# Patient Record
Sex: FEMALE | Race: WHITE | NOT HISPANIC OR LATINO | Employment: OTHER | URBAN - METROPOLITAN AREA
[De-identification: names, ages, dates, MRNs, and addresses within clinical notes are randomized per-mention and may not be internally consistent; named-entity substitution may affect disease eponyms.]

---

## 2017-02-21 NOTE — PATIENT DISCUSSION
DIABETES without RETINOPATHY:  I have talked with the patient about the potential for future development of diabetic retinopathy and the potential for significant visual complications. For this reason, regular follow-up with an ophthalmologist is essential. Vision may fluctuate with blood sugar, so the patient should continue to maintain stable blood sugar.

## 2017-02-21 NOTE — PATIENT DISCUSSION
DIABETES WITHOUT RETINOPATHY- Continue to keep Blood Sugar under control, any changes pt was told to call office for an appt

## 2017-02-21 NOTE — PATIENT DISCUSSION
DRY EYES : Discussed with patient the importance of keeping the eye moist and the symptoms associated with dry eyes including blurry vision, tearing, burning, and hany sensation. Advised patient to minimize use of any fans blowing directly on the face. Advised patient to continue with artificial tears 2-3 times daily.

## 2017-02-21 NOTE — PATIENT DISCUSSION
POAG with Elevated IOP Counseling:  I have explained the diagnosis of  glaucoma and discussed the importance of lowering intraocular pressure to prevent further optic nerve damage and possible blindness. I have discussed the various treatment options including medications, SLT laser and surgery. I have reviewed the regimen of drops with the patient. I emphasized to the patient the importance of compliance with treatment and follow-up appointments. Patient instructed to return for follow-up as scheduled.

## 2017-05-22 NOTE — TELEPHONE ENCOUNTER
I spoke to the pt, she states she used an old make up to her eyelids over the weekend and now here eyelids only are red and itchy. She wants something called in for the lid irritation. I spoke to Donald, she will ask Dr Harrison and let the patient know

## 2017-05-23 NOTE — TELEPHONE ENCOUNTER
Dr. Harrison reviewed Selma's chart and noted she was using Tobramycin ointment in the past for the same symptoms. He did ok a refill on the ointment to her pharmacy. Patient was notified, she will  the prescription and call us if she has any other problems.

## 2017-10-04 NOTE — PATIENT DISCUSSION
New Prescription: Maxitrol (neomycin-polymyxin-dexameth): ointment: 3.5-10,000-0.1 mg-unit/g-% 1 a small amount three times a day as directed into both eyes 10-

## 2017-10-19 ENCOUNTER — APPOINTMENT (OUTPATIENT)
Dept: OPHTHALMOLOGY | Facility: CLINIC | Age: 82
End: 2017-10-19
Payer: MEDICARE

## 2017-10-19 ENCOUNTER — TELEPHONE (OUTPATIENT)
Dept: OPHTHALMOLOGY | Facility: CLINIC | Age: 82
End: 2017-10-19

## 2017-10-19 ENCOUNTER — OFFICE VISIT (OUTPATIENT)
Dept: OPHTHALMOLOGY | Facility: CLINIC | Age: 82
End: 2017-10-19
Payer: MEDICARE

## 2017-10-19 DIAGNOSIS — H04.123 DRY EYES, BILATERAL: ICD-10-CM

## 2017-10-19 DIAGNOSIS — H40.003 GLAUCOMA SUSPECT, BILATERAL: Primary | ICD-10-CM

## 2017-10-19 DIAGNOSIS — E11.9 DIABETES MELLITUS WITHOUT OPHTHALMIC MANIFESTATIONS: ICD-10-CM

## 2017-10-19 DIAGNOSIS — Z96.1 PSEUDOPHAKIA OF BOTH EYES: ICD-10-CM

## 2017-10-19 PROCEDURE — 99999 PR PBB SHADOW E&M-EST. PATIENT-LVL II: CPT | Mod: PBBFAC,,, | Performed by: OPHTHALMOLOGY

## 2017-10-19 PROCEDURE — 92014 COMPRE OPH EXAM EST PT 1/>: CPT | Mod: S$PBB,,, | Performed by: OPHTHALMOLOGY

## 2017-10-19 PROCEDURE — 92250 FUNDUS PHOTOGRAPHY W/I&R: CPT | Mod: PBBFAC,PO | Performed by: OPHTHALMOLOGY

## 2017-10-19 PROCEDURE — 92083 EXTENDED VISUAL FIELD XM: CPT | Mod: PBBFAC,PO | Performed by: OPHTHALMOLOGY

## 2017-10-19 PROCEDURE — 99212 OFFICE O/P EST SF 10 MIN: CPT | Mod: PBBFAC,PO | Performed by: OPHTHALMOLOGY

## 2017-10-19 RX ORDER — PRIMIDONE 50 MG/1
50 TABLET ORAL
COMMUNITY
Start: 2017-08-03 | End: 2018-08-03

## 2017-10-19 RX ORDER — ARIPIPRAZOLE 2 MG/1
TABLET ORAL
COMMUNITY
Start: 2017-09-01

## 2017-10-19 NOTE — PROGRESS NOTES
SUBJECTIVE:   Selma Lopez is a 82 y.o. female   Uncorrected distance visual acuity was 20/25 -1 in the right eye and 20/50 in the left eye.   Chief Complaint   Patient presents with    Glaucoma Suspect     1 year hvf, dilation, sdp's    Dry Eye     c/o dryness OU. using at's        HPI:  HPI     Glaucoma Suspect    Additional comments: 1 year hvf, dilation, sdp's           Dry Eye    Additional comments: c/o dryness OU. using at's           Comments   1.PCIOL OD 2/15/06  PCIOL OS 3/15/06  YAG OD 6/10/09  YAG OS 5/18/09  2. Oasis Form Fit plugs OU .3mm 6/28/12  3. Coag Suspect  4. DM since 2000  5. Excisional biopsy of LLL 10/13/2016    Refresh TID OU       Last edited by Jennifer Beckford MA on 10/19/2017  1:53 PM. (History)        Assessment /Plan :  1. Glaucoma suspect, bilateral No evidence of glaucoma at this time but based on risk factors recommend to continue monitoring.     2. Diabetes mellitus without ophthalmic manifestations No diabetic retinopathy at this time. Reviewed diabetic eye precautions including avoiding tobacco use,  Good glucose control, and importance of regular follow up.      3. Pseudophakia of both eyes stable   4.      Dry Eyes continue artificial tears prn OU    Return to clinic in 1 year  or as needed.  With 24-2 HVF, Dilation and SDP's

## 2017-10-19 NOTE — TELEPHONE ENCOUNTER
Left message that she has her appointment sheet, just call to schedule appointment in 1 year with VF and dilated exam.  Call [phone] to schedule next appointment

## 2017-10-20 ENCOUNTER — TELEPHONE (OUTPATIENT)
Dept: OPHTHALMOLOGY | Facility: CLINIC | Age: 82
End: 2017-10-20

## 2017-10-20 NOTE — TELEPHONE ENCOUNTER
Attempted to call patient regarding her yearly appointment. Was not able to reach patient or leave a message. Will forward message to Danae to schedule patient's next visit with Dr. Harrison.     ----- Message from Nelida Vazquez sent at 10/19/2017  3:52 PM CDT -----  Contact: Pt  Pt called and stated she needed to speak to the nurse. She can be reached at [phone].    Thanks,  TF

## 2017-11-21 ENCOUNTER — HOSPITAL ENCOUNTER (OUTPATIENT)
Dept: RADIOLOGY | Facility: HOSPITAL | Age: 82
Discharge: HOME | End: 2017-11-21
Attending: NURSE PRACTITIONER
Payer: MEDICARE

## 2017-11-21 ENCOUNTER — OFFICE VISIT (OUTPATIENT)
Dept: HEMATOLOGY/ONCOLOGY | Facility: CLINIC | Age: 82
End: 2017-11-21
Payer: MEDICARE

## 2017-11-21 VITALS
DIASTOLIC BLOOD PRESSURE: 72 MMHG | HEART RATE: 82 BPM | BODY MASS INDEX: 33.32 KG/M2 | HEIGHT: 65 IN | HEIGHT: 65 IN | WEIGHT: 200 LBS | SYSTOLIC BLOOD PRESSURE: 132 MMHG | RESPIRATION RATE: 16 BRPM | WEIGHT: 200 LBS | BODY MASS INDEX: 33.32 KG/M2

## 2017-11-21 DIAGNOSIS — Z08 ENCOUNTER FOR FOLLOW-UP SURVEILLANCE OF BREAST CANCER: ICD-10-CM

## 2017-11-21 DIAGNOSIS — Z12.39 BREAST CANCER SCREENING, HIGH RISK PATIENT: ICD-10-CM

## 2017-11-21 DIAGNOSIS — C80.1 CANCER: Primary | ICD-10-CM

## 2017-11-21 DIAGNOSIS — Z85.3 PERSONAL HISTORY OF BREAST CANCER: ICD-10-CM

## 2017-11-21 DIAGNOSIS — Z85.3 ENCOUNTER FOR FOLLOW-UP SURVEILLANCE OF BREAST CANCER: ICD-10-CM

## 2017-11-21 PROCEDURE — 99999 PR PBB SHADOW E&M-EST. PATIENT-LVL IV: CPT | Mod: PBBFAC,,, | Performed by: NURSE PRACTITIONER

## 2017-11-21 PROCEDURE — 77067 SCR MAMMO BI INCL CAD: CPT | Mod: 26,,, | Performed by: RADIOLOGY

## 2017-11-21 PROCEDURE — 99214 OFFICE O/P EST MOD 30 MIN: CPT | Mod: PBBFAC,PO | Performed by: NURSE PRACTITIONER

## 2017-11-21 PROCEDURE — 77067 SCR MAMMO BI INCL CAD: CPT | Mod: TC

## 2017-11-21 PROCEDURE — 99213 OFFICE O/P EST LOW 20 MIN: CPT | Mod: S$PBB,,, | Performed by: NURSE PRACTITIONER

## 2017-11-21 PROCEDURE — 77063 BREAST TOMOSYNTHESIS BI: CPT | Mod: 26,,, | Performed by: RADIOLOGY

## 2017-11-21 NOTE — PROGRESS NOTES
HEMATOLOGY/ONCOLOGY    CC: Annual mammogram and breast exam    HPI: Prior history of a right breast cancer. 82-year-old white female who in 1996 underwent right total mastectomy for intraductal carcinoma. At that time, she had implants placed. She has had no real problems with her right breast since then. Approximately five years ago, she underwent a stereotactic core biopsy of the left breast which was benign. Noted to have questionable skin changes in the upperouter quadrant of the right breast.   S/P left breast stereotactic core needle biopsy performed in September of 2007, benign.     ROS: The patient denies any breast pain, nipple discharge or lumps out of the ordinary. She has tenderness in her breast only when the breast is palpated. No donn pain out of the ordinary. No cough or SOB. No chest pain. Has chronic pnd. No aches or pains. No night sweats out of the ordinary or wt loss. Balance of the ROS is negative.     PE:   OBJECTIVE: This is an adult female in no acute distress, awake, alert, and oriented x3.   HEAD: Atraumatic and normocephalic.  NECK: Supple. Trachea midline.No thyromegaly. Scar at base of neck well healed.  LYMPHATICS: There is no cervical, supraclavicular, infraclavicular adenopathy. She has no palpable nodes in either axilla.   HEART: RRR with no murmurs or gallops.  LUNGS: CTA unlabored respiratory effort.  SKIN: Warm and dry to touch. No petechaie or bruising. See Breast exam for breast skin change description.   ABD: Soft, obese and nontender.  BREAST: No visible mass, erythema, rash or dimpling. Right breast reconstructed mound noted. No visible skin changes noted. In the upright position the right breast implant appears to be higher than the rest of the breast. When lies down the implant spreads out evenly. No nipple discharge elicited with compression in the left breast. No palpable abnormality was appreciated in the left breast or on the right reconstructed breast. No bumpy skin  changes noted on today's exam as noted in the past.     MAMMO: today- results pending    ASSESSMENT:   1. History of right breast cancer   2. mammogram left breast- today- result pending.   3. Negative clinical exam.         PLAN AND RECOMMENDATIONS:   1. BSE monthly, call for any changes.   2. RTC in one year for left diagnostic mammo and clinical exam.   3. Call if she has not heard regarding mammo results in one week.   4. Encouraged to walk to help prevent recurrence and discussed recurrence signs to report.     Ashley Zeng RN, MSN, NP-C               Answers for HPI/ROS submitted by the patient on 11/21/2017   appetite change : No  unexpected weight change: No  visual disturbance: No  cough: No  shortness of breath: No  chest pain: No  abdominal pain: No  diarrhea: No  frequency: No  back pain: No  rash: No  headaches: No  adenopathy: No  nervous/ anxious: No

## 2018-02-22 NOTE — PATIENT DISCUSSION
POAG, OU: INTRAOCULAR PRESSURE IS WITHIN ACCEPTABLE LIMITS. PT INSTRUCTED TO CONTINUE TREATMENT WITHOUT DROPS  AND RETURN FOR FOLLOW-UP AS SCHEDULED.

## 2018-05-25 ENCOUNTER — TELEPHONE (OUTPATIENT)
Dept: OPHTHALMOLOGY | Facility: CLINIC | Age: 83
End: 2018-05-25

## 2018-05-25 NOTE — TELEPHONE ENCOUNTER
Informed pt that no doctors are in currently and that we cannot call anything in. She would have to wait until Monday. Pt stated that if she is still experiencing symptoms on Monday, she will call back.

## 2018-05-25 NOTE — TELEPHONE ENCOUNTER
----- Message from Daina Neal sent at 5/25/2018  4:10 PM CDT -----  Contact: [phone]  Would like to consult with nurse regarding mucus in her eye, please call back at [phone]. Thanks/ar

## 2018-08-23 NOTE — PATIENT DISCUSSION
POAG, OU: INTRAOCULAR PRESSURE IS WITHIN ACCEPTABLE LIMITS. PT INSTRUCTED TO CONTINUE TREATMENT WITH OUT DROPS  AND RETURN FOR FOLLOW-UP AS SCHEDULED.

## 2018-11-29 ENCOUNTER — TELEPHONE (OUTPATIENT)
Dept: HEMATOLOGY/ONCOLOGY | Facility: CLINIC | Age: 83
End: 2018-11-29

## 2018-11-29 NOTE — TELEPHONE ENCOUNTER
Spoke with pt and she said that she is in [facility]. That she needs orders to have a Mammo done out there where she is and her records. I asked her who it was and where to fax it to [facility] we needed to find out if they will take out of state orders. Pt said her daughter called them already and they will. Pt did not know who the provider was or their info and said she will call back with the info so we can fax the orders to them. And I will give her medical records number at [phone].

## 2018-11-29 NOTE — TELEPHONE ENCOUNTER
Spoke to pt again and informed her per Ashley Zeng NP that she recommends that she wait until she comes back here to do her Mammo if she is not going to be living out there. Pt said she does not know when she is coming back but it will not be for a year that she stays there. PT gave me the info to fax the Mammo order to Dr. Sedrick Wayne at [facility]. His Fax # [phone]. I gave her medical records number at [facility] to get her records.

## 2018-11-29 NOTE — TELEPHONE ENCOUNTER
----- Message from Loren Vera sent at 11/29/2018  8:32 AM CST -----  Contact: pt  Pt stated she's calling about orders for a mammogram to be done out of state she also stated she needs pictures of her last mammogram, she can be reached at [phone] Thanks

## 2018-12-05 ENCOUNTER — TELEPHONE (OUTPATIENT)
Dept: HEMATOLOGY/ONCOLOGY | Facility: CLINIC | Age: 83
End: 2018-12-05

## 2018-12-05 NOTE — TELEPHONE ENCOUNTER
Nursing Transfer Note      5/30/2018     Transfer To: [address] from PACU    Transfer via stretcher    Transfer with NA    Transported by RN and PCT    Medicines sent: bacitracin ointment    Chart send with patient: Yes    Notified: spouse    Patient reassessed at: 5/30/18 22:00       Dr. Wayne office in [facility] called to get the pts last mammo results faxed to them at [phone]. Results faxed

## 2019-01-29 ENCOUNTER — TELEPHONE (OUTPATIENT)
Dept: HEMATOLOGY/ONCOLOGY | Facility: CLINIC | Age: 84
End: 2019-01-29

## 2019-01-29 DIAGNOSIS — Z85.3 PERSONAL HISTORY OF BREAST CANCER: Primary | ICD-10-CM

## 2019-01-29 NOTE — TELEPHONE ENCOUNTER
----- Message from Felix Short sent at 1/29/2019  3:05 PM CST -----  Contact: pt   Pt would like cb in reference to mammo order request from earlier today.       ..[phone]

## 2019-01-29 NOTE — TELEPHONE ENCOUNTER
Spoke with pt and informed her that I scheduled her Mammo for 9am on 2/19/19 and then to see Ashley after at 10 am. That she may have a wait bc we are fitting her in. Pt verbalized understanding.

## 2019-01-29 NOTE — TELEPHONE ENCOUNTER
PT is asking to be worked in now for a mammogram since she did not do it back in November when she asked us to send orders to a  Doctor in [place] for it. She said she can only do it when she is here on 2/19/19 for am. I told her I will check with Ashley and see what she can do and call her back.

## 2019-02-18 NOTE — PROGRESS NOTES
HEMATOLOGY/ONCOLOGY    CC: Annual mammogram and breast exam    HPI: Prior history of a right breast cancer. 83-year-old white female who in 1996 underwent right total mastectomy for intraductal carcinoma. At that time, she had implants placed. She has had no real problems with her right breast since then. Approximately 6 years ago, she underwent a stereotactic core biopsy of the left breast which was benign. Noted to have questionable skin changes in the upperouter quadrant of the right breast.   S/P left breast stereotactic core needle biopsy performed in September of 2007, benign.     Pt trying to sell her home and move into assisted living in [place]    ROS: The patient denies any breast pain, nipple discharge or lumps out of the ordinary. She has tenderness in her breast only when the breast is palpated. No donn pain out of the ordinary. No cough or SOB. No chest pain. Has chronic pnd. No aches or pains. No night sweats out of the ordinary or wt loss. Balance of the ROS is negative.     PE:   OBJECTIVE: This is an adult female in no acute distress, awake, alert, and oriented x3.   HEAD: Atraumatic and normocephalic.  NECK: Supple. Trachea midline.No thyromegaly. Scar at base of neck well healed.  LYMPHATICS: There is no cervical, supraclavicular, infraclavicular adenopathy. She has no palpable nodes in either axilla.   HEART: RRR with no murmurs or gallops.  LUNGS: CTA unlabored respiratory effort.  SKIN: Warm and dry to touch. No petechaie or bruising. See Breast exam for breast skin change description.   ABD: Soft, obese and nontender.  BREAST: No visible mass, erythema, rash or dimpling. Right breast reconstructed mound noted. No visible skin changes noted. In the upright position the right breast implant appears to be higher than the rest of the breast. When lies down the implant spreads out evenly. No nipple discharge elicited with compression in the left breast. No palpable abnormality was appreciated in  the left breast or on the right reconstructed breast. No bumpy skin changes noted on today's exam as noted in the past.     MAMMO: today- results pending    ASSESSMENT:   1. History of right breast cancer   2. mammogram left breast- today- result pending.   3. Negative clinical exam.         PLAN AND RECOMMENDATIONS:   1. BSE monthly, call for any changes.   2. RTC in one year for left diagnostic mammo and clinical exam.   3. Call if she has not heard regarding mammo results in one week.   4. Encouraged to walk to help prevent recurrence and discussed recurrence signs to report.     Ashley Zeng, RN, MSN, NP-C

## 2019-02-19 ENCOUNTER — OFFICE VISIT (OUTPATIENT)
Dept: HEMATOLOGY/ONCOLOGY | Facility: CLINIC | Age: 84
End: 2019-02-19
Payer: MEDICARE

## 2019-02-19 ENCOUNTER — HOSPITAL ENCOUNTER (OUTPATIENT)
Dept: RADIOLOGY | Facility: HOSPITAL | Age: 84
Discharge: HOME | End: 2019-02-19
Attending: NURSE PRACTITIONER
Payer: MEDICARE

## 2019-02-19 VITALS
HEIGHT: 65 IN | HEART RATE: 76 BPM | DIASTOLIC BLOOD PRESSURE: 80 MMHG | TEMPERATURE: 98 F | BODY MASS INDEX: 34.38 KG/M2 | SYSTOLIC BLOOD PRESSURE: 120 MMHG | HEIGHT: 65 IN | WEIGHT: 200 LBS | OXYGEN SATURATION: 95 % | RESPIRATION RATE: 18 BRPM | BODY MASS INDEX: 33.32 KG/M2 | WEIGHT: 206.38 LBS

## 2019-02-19 DIAGNOSIS — Z85.3 PERSONAL HISTORY OF BREAST CANCER: Primary | ICD-10-CM

## 2019-02-19 DIAGNOSIS — Z12.39 BREAST CANCER SCREENING, HIGH RISK PATIENT: ICD-10-CM

## 2019-02-19 DIAGNOSIS — Z85.3 PERSONAL HISTORY OF BREAST CANCER: ICD-10-CM

## 2019-02-19 DIAGNOSIS — Z12.31 VISIT FOR SCREENING MAMMOGRAM: ICD-10-CM

## 2019-02-19 PROCEDURE — 77067 SCR MAMMO BI INCL CAD: CPT | Mod: 26,,, | Performed by: RADIOLOGY

## 2019-02-19 PROCEDURE — 77067 MAMMO DIGITAL SCREENING LEFT WITH TOMOSYNTHESIS_CAD: ICD-10-PCS | Mod: 26,,, | Performed by: RADIOLOGY

## 2019-02-19 PROCEDURE — 77063 BREAST TOMOSYNTHESIS BI: CPT | Mod: 26,,, | Performed by: RADIOLOGY

## 2019-02-19 PROCEDURE — 77067 SCR MAMMO BI INCL CAD: CPT | Mod: TC

## 2019-02-19 PROCEDURE — 77063 MAMMO DIGITAL SCREENING LEFT WITH TOMOSYNTHESIS_CAD: ICD-10-PCS | Mod: 26,,, | Performed by: RADIOLOGY

## 2019-02-19 PROCEDURE — 99213 OFFICE O/P EST LOW 20 MIN: CPT | Mod: S$PBB,,, | Performed by: NURSE PRACTITIONER

## 2019-02-19 PROCEDURE — 99214 OFFICE O/P EST MOD 30 MIN: CPT | Mod: PBBFAC,PN | Performed by: NURSE PRACTITIONER

## 2019-02-19 PROCEDURE — 99999 PR PBB SHADOW E&M-EST. PATIENT-LVL IV: CPT | Mod: PBBFAC,,, | Performed by: NURSE PRACTITIONER

## 2019-02-19 PROCEDURE — 99213 PR OFFICE/OUTPT VISIT, EST, LEVL III, 20-29 MIN: ICD-10-PCS | Mod: S$PBB,,, | Performed by: NURSE PRACTITIONER

## 2019-02-19 PROCEDURE — 99999 PR PBB SHADOW E&M-EST. PATIENT-LVL IV: ICD-10-PCS | Mod: PBBFAC,,, | Performed by: NURSE PRACTITIONER

## 2019-02-19 RX ORDER — FUROSEMIDE 20 MG/1
20 TABLET ORAL DAILY
COMMUNITY

## 2019-02-19 NOTE — PATIENT DISCUSSION
DRY EYES : Discussed with patient the importance of keeping the eye moist and the symptoms associated with dry eyes including blurry vision, tearing, burning, and hany sensation. Tear Lab was performed today to evaluate the severity of dryness. Advised patient to minimize use of any fans blowing directly on the face. Advised patient to continue with over the counter artificial tears 2-3 times daily.

## 2019-12-31 ENCOUNTER — TELEPHONE (OUTPATIENT)
Dept: HEMATOLOGY/ONCOLOGY | Facility: CLINIC | Age: 84
End: 2019-12-31

## 2019-12-31 NOTE — TELEPHONE ENCOUNTER
----- Message from Nelson Stephen sent at 12/31/2019  8:15 AM CST -----  Contact: pt   Type:  Sooner Apoointment Request    Caller is requesting a sooner appointment.  Caller declined first available appointment listed below.  Caller will not accept being placed on the waitlist and is requesting a message be sent to doctor.  Name of Caller:SHERICE EAGLE  When is the first available appointment? 02/04/2020  Symptoms:  Would the patient rather a call back or a response via [facility]? Call   Best Call Back Number: [phone] (home)   Additional Information: pt is requesting a call back from the nurse in regards to the pt needing a sooner apt because she will be in town from 01/11/2020-01/31/2020 please

## 2019-12-31 NOTE — TELEPHONE ENCOUNTER
"Patient requesting a sooner appointment.  Offered patient a sooner appointment at [facility] but patient declined and states "I will not see Marcela then way out [place] [address] I will just do my mammogram and not see her.  Advised patient on the importance of keeping appointments to discuss results, patient refused.    "

## 2020-02-26 ENCOUNTER — TELEPHONE (OUTPATIENT)
Dept: HEMATOLOGY/ONCOLOGY | Facility: CLINIC | Age: 85
End: 2020-02-26

## 2021-08-16 ENCOUNTER — IMPORTED ENCOUNTER (OUTPATIENT)
Dept: URBAN - METROPOLITAN AREA CLINIC 1 | Facility: CLINIC | Age: 86
End: 2021-08-16

## 2021-08-16 PROBLEM — Z96.1: Noted: 2021-08-16

## 2021-08-16 PROBLEM — H04.123: Noted: 2021-08-16

## 2021-08-16 PROBLEM — H16.143: Noted: 2021-08-16

## 2021-08-16 PROBLEM — E11.9: Noted: 2021-08-16

## 2021-08-16 PROBLEM — H26.491: Noted: 2021-08-16

## 2021-08-16 PROBLEM — Z79.84: Noted: 2021-08-16

## 2021-08-16 PROCEDURE — 99204 OFFICE O/P NEW MOD 45 MIN: CPT

## 2021-08-16 NOTE — PATIENT DISCUSSION
1.  DM Type II (Oral Meds) -- without sign of diabetic retinopathy and no blot heme on dilated retinal examination today OU No Macular Edema. Discussed the pathophysiology of diabetes and its effect on the eye and risk of blindness. Stressed the importance of strong glucose control. Advised of importance of at least yearly dilated examinations but to contact us immediately for any problems or concerns. 2. PCO OD -- (Posterior Capsule Opacification) Observe and consider yag cap when pt feels pco visually significant and visual acuity decreases to appropriate level. 3. AUGUST w/ PEK OU -- Recommended ATs TID OU routinely (Sample of given). 4.  Pseudophakia OU -- Doing well (Dr. Alexey Ladd; 8 years ago). **All conditions discussed with patients daughter and patient**Patient deferred MRx at today's visit. Letter to PCP. Return for an appointment in 1 year 27 with Dr. Laila Umanzor.

## 2021-08-16 NOTE — PATIENT DISCUSSION
PCO OD -- (Posterior Capsule Opacification) Observe and consider yag cap when pt feels pco visually significant and visual acuity decreases to appropriate level.

## 2022-04-02 ASSESSMENT — VISUAL ACUITY
OS_SC: J5
OS_CC: 20/40
OD_SC: J5
OD_CC: 20/25-1

## 2022-04-02 ASSESSMENT — TONOMETRY
OD_IOP_MMHG: 9
OS_IOP_MMHG: 9

## 2022-11-09 ENCOUNTER — EMERGENCY VISIT (OUTPATIENT)
Dept: URBAN - METROPOLITAN AREA CLINIC 1 | Facility: CLINIC | Age: 87
End: 2022-11-09

## 2022-11-09 DIAGNOSIS — H01.024: ICD-10-CM

## 2022-11-09 DIAGNOSIS — H01.021: ICD-10-CM

## 2022-11-09 PROCEDURE — 99213 OFFICE O/P EST LOW 20 MIN: CPT

## 2022-11-09 RX ORDER — TOBRAMYCIN / DEXAMETHASONE 3; .5 MG/ML; MG/ML: 1 SUSPENSION/ DROPS OPHTHALMIC

## 2022-11-09 ASSESSMENT — VISUAL ACUITY
OU_CC: J1+
OS_SC: 20/30
OD_SC: 20/25-1

## 2022-11-09 ASSESSMENT — TONOMETRY
OS_IOP_MMHG: 9
OD_IOP_MMHG: 9

## 2022-11-09 NOTE — PATIENT DISCUSSION
Patient will begin Tobradex ST TID OU (sample given to begin and erx'd to [place]) for a week. Recommended the use of hot compresses Qday x 5 minutes for 1 week. Recommended lid scrubs and the use of shampoo. Will f/u in 1 week.

## 2022-11-16 ENCOUNTER — FOLLOW UP (OUTPATIENT)
Dept: URBAN - METROPOLITAN AREA CLINIC 1 | Facility: CLINIC | Age: 87
End: 2022-11-16

## 2022-11-16 DIAGNOSIS — H01.024: ICD-10-CM

## 2022-11-16 DIAGNOSIS — H01.021: ICD-10-CM

## 2022-11-16 PROCEDURE — 99214 OFFICE O/P EST MOD 30 MIN: CPT

## 2022-11-16 ASSESSMENT — TONOMETRY
OS_IOP_MMHG: 11
OD_IOP_MMHG: 11

## 2022-11-16 ASSESSMENT — VISUAL ACUITY
OS_CC: 20/40
OD_CC: 20/30

## 2023-06-12 ENCOUNTER — TELEPHONE (OUTPATIENT)
Dept: OPHTHALMOLOGY | Facility: CLINIC | Age: 88
End: 2023-06-12
Payer: MEDICARE

## 2023-06-12 NOTE — TELEPHONE ENCOUNTER
Returned call,  LVM     ----- Message from Misty Washington sent at 6/12/2023  3:10 PM CDT -----  Contact: Selma  .Type:  Same Day Appointment Request    Caller is requesting a same day appointment.  Caller declined first available appointment listed below.    Name of Caller: Selma   When is the first available appointment? Unknown   Symptoms: very itchy eyes, was previously stuck together upon waking up   Best Call Back Number: .[phone]  Additional Information:  Pt sent an appt request this morning and hasn't  heard back. Is willingly to be seen by someone else in the clinic to get seen as soon as possible.

## 2023-06-12 NOTE — TELEPHONE ENCOUNTER
Rescheduled appointment for 06/13/2023 with     ----- Message from Jennyfer Clements sent at 6/12/2023  3:28 PM CDT -----  Contact: Selma  .Type:  Same Day Appointment Request     Caller is requesting a same day appointment.  Caller declined first available appointment listed below.    Name of Caller: Selma   When is the first available appointment? Unknown   Symptoms: very itchy eyes, was previously stuck together upon waking up   Best Call Back Number: .[phone]  Additional Information:  Pt sent an appt request this morning and hasn't  heard back. Is willingly to be seen by someone else in the clinic to get seen as soon as possible.

## 2023-06-12 NOTE — TELEPHONE ENCOUNTER
Returned call, no answer-- unable to leave vm  ----- Message from Chip Duran sent at 6/12/2023 10:25 AM CDT -----  Contact: [phone]  Pt is calling in regards to scheduling an eye exam appt. Please call pt back at [phone]. Thanks KB

## 2023-06-13 ENCOUNTER — OFFICE VISIT (OUTPATIENT)
Dept: OPHTHALMOLOGY | Facility: CLINIC | Age: 88
End: 2023-06-13
Payer: MEDICARE

## 2023-06-13 DIAGNOSIS — H01.01A ULCERATIVE BLEPHARITIS OF UPPER AND LOWER EYELIDS OF BOTH EYES: ICD-10-CM

## 2023-06-13 DIAGNOSIS — H01.01B ULCERATIVE BLEPHARITIS OF UPPER AND LOWER EYELIDS OF BOTH EYES: ICD-10-CM

## 2023-06-13 DIAGNOSIS — H10.13 ALLERGIC CONJUNCTIVITIS OF BOTH EYES: Primary | ICD-10-CM

## 2023-06-13 PROCEDURE — 99203 OFFICE O/P NEW LOW 30 MIN: CPT | Mod: S$PBB,,, | Performed by: OPTOMETRIST

## 2023-06-13 PROCEDURE — 99213 OFFICE O/P EST LOW 20 MIN: CPT | Mod: PBBFAC,PO | Performed by: OPTOMETRIST

## 2023-06-13 PROCEDURE — 99203 PR OFFICE/OUTPT VISIT, NEW, LEVL III, 30-44 MIN: ICD-10-PCS | Mod: S$PBB,,, | Performed by: OPTOMETRIST

## 2023-06-13 PROCEDURE — 99999 PR PBB SHADOW E&M-EST. PATIENT-LVL III: ICD-10-PCS | Mod: PBBFAC,,, | Performed by: OPTOMETRIST

## 2023-06-13 PROCEDURE — 99999 PR PBB SHADOW E&M-EST. PATIENT-LVL III: CPT | Mod: PBBFAC,,, | Performed by: OPTOMETRIST

## 2023-06-13 RX ORDER — TOBRAMYCIN AND DEXAMETHASONE 3; 1 MG/ML; MG/ML
1-2 SUSPENSION/ DROPS OPHTHALMIC 3 TIMES DAILY
Qty: 5 ML | Refills: 0 | Status: SHIPPED | OUTPATIENT
Start: 2023-06-13 | End: 2023-06-23

## 2023-06-13 NOTE — PATIENT INSTRUCTIONS
Continue Thera-tears 4 times daily with lid scrubs   Start Pataday 1 time daily   Start Tobradex 3 times daily for 7 days then stop

## 2023-06-13 NOTE — PROGRESS NOTES
HPI    Laterality: OU  Pain Scale:  6  Onset:   3 days ago  Discharge:   yes  A.M. Matting:  yes  Itch:   yes  Redness:   yes  Photophobia:   yes  Foreign body sensation:   no  Deep pain:   no  Previous occurrence:   yes  Drops:   theraTears OTC  Contact lens wear? no     Tobradex OU   Last edited by Kole Mcnamara, OD on 6/13/2023  3:13 PM.            Assessment /Plan     For exam results, see Encounter Report.    Allergic conjunctivitis of both eyes  Discussed OTC pataday 1gtt qAM PRN for itching.     Ulcerative blepharitis of upper and lower eyelids of both eyes  -     tobramycin-dexAMETHasone 0.3-0.1% (TOBRADEX) 0.3-0.1 % DrpS; Place 1-2 drops into both eyes 3 (three) times daily. for 10 days  Dispense: 5 mL; Refill: 0  Discussed warm compresses twice daily and lid hygiene, with preservative free artificial tears instillation four times daily. Patient to return to clinic if no improvement in symptoms with current treatment.       RTC with Dr BARTLETT as scheduled sooner if any changes to vision or worsening symptoms.

## 2023-09-27 ENCOUNTER — COMPREHENSIVE EXAM (OUTPATIENT)
Dept: URBAN - METROPOLITAN AREA CLINIC 1 | Facility: CLINIC | Age: 88
End: 2023-09-27

## 2023-09-27 DIAGNOSIS — H01.024: ICD-10-CM

## 2023-09-27 DIAGNOSIS — H04.123: ICD-10-CM

## 2023-09-27 DIAGNOSIS — H16.143: ICD-10-CM

## 2023-09-27 DIAGNOSIS — E11.9: ICD-10-CM

## 2023-09-27 DIAGNOSIS — H26.493: ICD-10-CM

## 2023-09-27 DIAGNOSIS — H01.021: ICD-10-CM

## 2023-09-27 PROCEDURE — 99214 OFFICE O/P EST MOD 30 MIN: CPT

## 2023-09-27 ASSESSMENT — TONOMETRY
OS_IOP_MMHG: 11
OD_IOP_MMHG: 12

## 2023-09-27 ASSESSMENT — VISUAL ACUITY
OD_CC: J1
OS_CC: J1
OD_SC: 20/25
OS_SC: 20/25

## 2024-10-31 ENCOUNTER — COMPREHENSIVE EXAM (OUTPATIENT)
Dept: URBAN - METROPOLITAN AREA CLINIC 1 | Facility: CLINIC | Age: 89
End: 2024-10-31

## 2024-10-31 DIAGNOSIS — E11.9: ICD-10-CM

## 2024-10-31 DIAGNOSIS — H01.021: ICD-10-CM

## 2024-10-31 DIAGNOSIS — H16.143: ICD-10-CM

## 2024-10-31 DIAGNOSIS — H01.024: ICD-10-CM

## 2024-10-31 DIAGNOSIS — H26.493: ICD-10-CM

## 2024-10-31 DIAGNOSIS — H04.123: ICD-10-CM

## 2024-10-31 PROCEDURE — 99214 OFFICE O/P EST MOD 30 MIN: CPT
